# Patient Record
Sex: MALE | Race: WHITE | Employment: FULL TIME | URBAN - METROPOLITAN AREA
[De-identification: names, ages, dates, MRNs, and addresses within clinical notes are randomized per-mention and may not be internally consistent; named-entity substitution may affect disease eponyms.]

---

## 2017-01-21 ENCOUNTER — ED HISTORICAL/CONVERTED ENCOUNTER (OUTPATIENT)
Dept: OTHER | Age: 22
End: 2017-01-21

## 2022-01-03 ENCOUNTER — HOSPITAL ENCOUNTER (EMERGENCY)
Age: 27
Discharge: LEFT BEFORE DISCHARGE | End: 2022-01-03
Payer: COMMERCIAL

## 2022-01-03 VITALS
SYSTOLIC BLOOD PRESSURE: 124 MMHG | TEMPERATURE: 98.4 F | RESPIRATION RATE: 16 BRPM | OXYGEN SATURATION: 96 % | HEIGHT: 72 IN | HEART RATE: 90 BPM | DIASTOLIC BLOOD PRESSURE: 82 MMHG | WEIGHT: 235 LBS | BODY MASS INDEX: 31.83 KG/M2

## 2022-01-03 PROCEDURE — 75810000275 HC EMERGENCY DEPT VISIT NO LEVEL OF CARE

## 2022-01-04 ENCOUNTER — HOSPITAL ENCOUNTER (EMERGENCY)
Age: 27
Discharge: HOME | End: 2022-01-04
Attending: EMERGENCY MEDICINE
Payer: COMMERCIAL

## 2022-01-04 VITALS
OXYGEN SATURATION: 98 % | RESPIRATION RATE: 18 BRPM | SYSTOLIC BLOOD PRESSURE: 126 MMHG | WEIGHT: 235 LBS | HEART RATE: 77 BPM | HEIGHT: 72 IN | DIASTOLIC BLOOD PRESSURE: 89 MMHG | TEMPERATURE: 98.6 F | BODY MASS INDEX: 31.83 KG/M2

## 2022-01-04 DIAGNOSIS — B34.9 VIRAL SYNDROME: Primary | ICD-10-CM

## 2022-01-04 PROCEDURE — 99282 EMERGENCY DEPT VISIT SF MDM: CPT

## 2022-01-04 RX ORDER — NAPROXEN 500 MG/1
500 TABLET ORAL
Qty: 20 TABLET | Refills: 0 | Status: SHIPPED | OUTPATIENT
Start: 2022-01-04

## 2022-01-04 NOTE — LETTER
[facility] EMERGENCY DEPARTMENT  [address] [id number] [phone]    Work/School Note    Date: 1/4/2022     To Whom It May concern:    Loan Centeno was evaluated by the following provider(s):  Attending Provider: Priscila Umanzor [facility] virus is suspected. Per the CDC guidelines we recommend home isolation until the following conditions are all met:    1. At least five days have passed since symptoms first appeared and/or had a close exposure,   2. After home isolation for five days, wearing a mask around others for the next five days,  3. At least 24 have passed since last fever without the use of fever-reducing medications and  4.  Symptoms (eg cough, shortness of breath) have improved      Sincerely,    Dr. Kalina Iraheta

## 2022-01-04 NOTE — ED PROVIDER NOTES
EMERGENCY DEPARTMENT HISTORY AND PHYSICAL EXAM      Date: 1/4/2022  Patient Name: Gudelia Nicole    History of Presenting Illness     Chief Complaint   Patient presents with    Fever    Generalized Body Aches    Nasal Congestion       History Provided By: Patient    HPI: Gudelia Nicole, 27 y.o. male presents to the ED with CC of fever generalized body aches nasal congestion with some mild headache going on for the past couple of days. Patient conveys that is been exposed to people with Covid. Treating with over-the-counter remedies without complete relief of his symptoms. Symptoms started on Monday of this week. Symptoms are mild and progressive. Patient denies SOB, chest pain, or any neurological symptoms. There are no other complaints, changes, or physical findings at this time. Past History     Past Medical History:  No past medical history on file. Allergies: Allergies   Allergen Reactions    Benadryl [Diphenhydramine Hcl] Itching       Review of Systems   Vital signs and nursing notes reviewed  Review of Systems   Constitutional: Positive for fever. Negative for appetite change, chills and fatigue. HENT: Positive for congestion. Negative for sinus pain. Eyes: Negative. Negative for pain and visual disturbance. Respiratory: Negative. Negative for chest tightness and shortness of breath. Cardiovascular: Negative. Negative for chest pain. Gastrointestinal: Negative. Negative for abdominal pain, diarrhea, nausea and vomiting. Genitourinary: Negative. Negative for difficulty urinating. No discharge   Musculoskeletal: Positive for myalgias. Negative for arthralgias. Skin: Negative. Negative for rash. Neurological: Positive for headaches. Negative for weakness. Hematological: Negative. Psychiatric/Behavioral: Negative. Negative for agitation. The patient is not nervous/anxious. All other systems reviewed and are negative.         Physical Exam     Visit Vitals  /89 (BP 1 Location: Right upper arm, BP Patient Position: At rest)   Pulse 77   Temp 98.6 °F (37 °C)   Resp 18   Ht 6' (1.829 m)   Wt 106.6 kg (235 lb)   SpO2 98%   BMI 31.87 kg/m²     CONSTITUTIONAL: Alert, in no distress. Appears stated age. HEAD:  Normocephalic, atraumatic  EYES: EOM intact. No conjunctival injection or scleral icterus  Neck:  Supple. No meningismus  RESP: Normal with no work of breathing, speaking in full sentences. CV: Well perfused. NEURO: Alert with normal mentation, moving extremities spontaneously  PSYCH: Normal mood, normal affect  *    Medical Decision Making   Patient presents for COVID 19 testing with normal oxygen saturation and mild URI symptoms or COVID 19 exposure. COVID 19 testing was not conducted. The patient was given quarantine/isolation recommendations and agrees with the plan to be discharged home. They were provided instructions to return for difficulty breathing, chest pain, altered mentation, or any other new or worsening symptoms. ED Course:   Initial assessment performed. The patients presenting problems have been discussed, and they are in agreement with the care plan formulated and outlined with them. I have encouraged them to ask questions as they arise throughout their visit. Critical Care Time: None    Disposition:  DISCHARGE NOTE:  The pt is ready for discharge. The pt's signs, symptoms, diagnosis, and discharge instructions have been discussed and pt has conveyed their understanding. The pt is to follow up as recommended or return to ER should their symptoms worsen. Plan has been discussed and pt is in agreement. PLAN:  1. Current Discharge Medication List      START taking these medications    Details   naproxen (NAPROSYN) 500 mg tablet Take 1 Tablet by mouth every twelve (12) hours as needed for Pain. Qty: 20 Tablet, Refills: 0  Start date: 1/4/2022           2.    Follow-up Information     Follow up With Specialties Details Why Contact Info    [facility] Emergency Medicine Call in 1 week As needed, If symptoms worsen [address] [place] [id number] [phone]        3. COVID Testing results will be called once available if positive. Patient should utilize [facility] to access results. 4. Take Tylenol or Ibuprofen as needed  5. Drink plenty of fluids  6. Return to ED if worse especially if any shortness of breath, chest pain or altered mentation. Diagnosis     Clinical Impression:   1. Viral syndrome            Please note that this dictation was completed with [employer], the computer voice recognition software. Quite often unanticipated grammatical, syntax, homophones, and other interpretive errors are inadvertently transcribed by the computer software. Please disregards these errors. Please excuse any errors that have escaped final proofreading.

## 2022-01-04 NOTE — Clinical Note
[address] [facility] EMERGENCY DEPARTMENT  [address] [place] [address]  [phone]    Work/School Note    Date: 1/4/2022     To Whom It May concern:    Alfreda Morejon was evaluated by the following provider(s):  Attending Provider: Joe Estrada [address] [place] [address] virus is suspected. Per the CDC guidelines we recommend home isolation until the following conditions are all met:    1. At least five days have passed since symptoms first appeared and/or had a close exposure,   2. After home isolation for five days, wearing a mask around others for the next five days,  3. At least 24 have passed since last fever without the use of fever-reducing medications and  4.  Symptoms (eg cough, shortness of breath) have improved      Sincerely,          Jenny Carreon MD

## 2022-01-04 NOTE — Clinical Note
Eliazar Restrepo was seen and treated in our emergency department on 1/4/2022.     Patient should be excused from work from January 3, 2022 to January 8, 2022    Kev Du MD

## 2022-04-08 ENCOUNTER — HOSPITAL ENCOUNTER (EMERGENCY)
Age: 27
Discharge: HOME | End: 2022-04-08
Attending: EMERGENCY MEDICINE
Payer: COMMERCIAL

## 2022-04-08 VITALS
HEART RATE: 96 BPM | OXYGEN SATURATION: 97 % | SYSTOLIC BLOOD PRESSURE: 140 MMHG | BODY MASS INDEX: 30.48 KG/M2 | RESPIRATION RATE: 18 BRPM | WEIGHT: 225 LBS | HEIGHT: 72 IN | TEMPERATURE: 98 F | DIASTOLIC BLOOD PRESSURE: 86 MMHG

## 2022-04-08 DIAGNOSIS — L60.0 INGROWN RIGHT GREATER TOENAIL: Primary | ICD-10-CM

## 2022-04-08 DIAGNOSIS — L65.9 HAIR LOSS: ICD-10-CM

## 2022-04-08 PROCEDURE — 74011250637 HC RX REV CODE- 250/637: Performed by: EMERGENCY MEDICINE

## 2022-04-08 PROCEDURE — 99283 EMERGENCY DEPT VISIT LOW MDM: CPT

## 2022-04-08 RX ORDER — CEPHALEXIN 500 MG/1
500 CAPSULE ORAL ONCE
Status: COMPLETED | OUTPATIENT
Start: 2022-04-08 | End: 2022-04-08

## 2022-04-08 RX ORDER — CEPHALEXIN 500 MG/1
500 CAPSULE ORAL 4 TIMES DAILY
Qty: 28 CAPSULE | Refills: 0 | Status: SHIPPED | OUTPATIENT
Start: 2022-04-08 | End: 2022-04-15

## 2022-04-08 RX ORDER — IBUPROFEN 800 MG/1
800 TABLET ORAL
Qty: 20 TABLET | Refills: 0 | Status: SHIPPED | OUTPATIENT
Start: 2022-04-08 | End: 2022-04-15

## 2022-04-08 RX ORDER — ACETAMINOPHEN 500 MG
1000 TABLET ORAL ONCE
Status: COMPLETED | OUTPATIENT
Start: 2022-04-08 | End: 2022-04-08

## 2022-04-08 RX ORDER — IBUPROFEN 800 MG/1
800 TABLET ORAL ONCE
Status: COMPLETED | OUTPATIENT
Start: 2022-04-08 | End: 2022-04-08

## 2022-04-08 RX ADMIN — IBUPROFEN 800 MG: 800 TABLET, FILM COATED ORAL at 21:12

## 2022-04-08 RX ADMIN — CEPHALEXIN 500 MG: 500 CAPSULE ORAL at 21:13

## 2022-04-08 RX ADMIN — ACETAMINOPHEN 1000 MG: 500 TABLET ORAL at 21:12

## 2022-04-08 NOTE — Clinical Note
[address] [facility] EMERGENCY DEPARTMENT  Katharine Ferreira [place] [id number]  [phone]    Work/School Note    Date: 4/8/2022    To Whom It May concern:    Edwina Arenas was seen and treated today in the emergency room by the following provider(s):  Attending Provider: Linus Lancaster MD.      Edwina Arenas is excused from work/school on 4/8/2022 through 4/10/2022. He is medically clear to return to work/school on 4/11/2022.          Sincerely,          Jaki Hui MD

## 2022-04-09 NOTE — ED TRIAGE NOTES
Pt states toe infection, right great toe, x 2 D, painful, also states \"abnormal amount\" of hair loss, denies scalp itching or pain.

## 2022-04-09 NOTE — ED PROVIDER NOTES
EMERGENCY DEPARTMENT HISTORY AND PHYSICAL EXAM      Date: 4/8/2022  Patient Name: Jose F Winter    History of Presenting Illness     Chief Complaint   Patient presents with    Toe Pain    Alopecia       History Provided By: Patient    HPI: Jose F Winter, 24 y.o. male   presents to the ED with cc of toe pain. Patient complains of right first toe discomfort since yesterday. No injury. No fever chills. Patient also complains of generalized hair loss for last several weeks. Positive family history of baldness. PCP: None    No current facility-administered medications on file prior to encounter. Current Outpatient Medications on File Prior to Encounter   Medication Sig Dispense Refill    naproxen (NAPROSYN) 500 mg tablet Take 1 Tablet by mouth every twelve (12) hours as needed for Pain. 20 Tablet 0       Past History     Past Medical History:  No past medical history on file. Past Surgical History:  No past surgical history on file. Family History:  No family history on file. Social History:  Social History     Tobacco Use    Smoking status: Never Smoker    Smokeless tobacco: Never Used   Substance Use Topics    Alcohol use: Yes     Comment: infrenquent    Drug use: Never       Allergies: Allergies   Allergen Reactions    Benadryl [Diphenhydramine Hcl] Itching         Review of Systems   Review of Systems   Constitutional: Negative for fever. Musculoskeletal: Positive for arthralgias. Skin: Positive for wound. Neurological: Negative for headaches. Physical Exam   Physical Exam  Vitals and nursing note reviewed. Constitutional:       General: He is not in acute distress. Appearance: Normal appearance. He is not ill-appearing, toxic-appearing or diaphoretic. HENT:      Head: Normocephalic and atraumatic.       Comments: No appreciable bald spot     Mouth/Throat:      Mouth: Mucous membranes are moist.   Eyes:      Conjunctiva/sclera: Conjunctivae normal. Pulmonary:      Effort: Pulmonary effort is normal.   Musculoskeletal:      Cervical back: Neck supple. Comments: Right first toe with mild swelling on the lateral side without drainage consistent with a mild ingrown toenail   Skin:     General: Skin is warm and dry. Neurological:      General: No focal deficit present. Mental Status: He is alert. Psychiatric:         Behavior: Behavior normal.         Diagnostic Study Results     Labs -   No results found for this or any previous visit (from the past 12 hour(s)). Radiologic Studies -   No orders to display     CT Results  (Last 48 hours)    None        CXR Results  (Last 48 hours)    None            Medical Decision Making   I am the first provider for this patient. I reviewed the vital signs, available nursing notes, past medical history, past surgical history, family history and social history. Vital Signs-Reviewed the patient's vital signs. Patient Vitals for the past 12 hrs:   Temp Pulse Resp BP SpO2   04/08/22 2035 98 °F (36.7 °C) 96 18 (!) 140/86 97 %       Records Reviewed:     Provider Notes (Medical Decision Making):       ED Course:   Initial assessment performed. The patients presenting problems have been discussed, and they are in agreement with the care plan formulated and outlined with them. I have encouraged them to ask questions as they arise throughout their visit. PROCEDURES      Disposition: Condition stable   DC- Adult Discharges: All of the diagnostic tests were reviewed and questions answered. Diagnosis, care plan and treatment options were discussed. understand instructions and will follow up as directed. The patients results have been reviewed with them. They have been counseled regarding their diagnosis. The patient verbally convey understanding and agreement of the signs, symptoms, diagnosis, treatment and prognosis and additionally agrees to follow up as recommended.   They also agree with the care-plan and convey that all of their questions have been answered. I have also put together some discharge instructions for them that include: 1) educational information regarding their diagnosis, 2) how to care for their diagnosis at home, as well a 3) list of reasons why they would want to return to the ED prior to their follow-up appointment, should their condition change. PLAN:  1. Current Discharge Medication List      START taking these medications    Details   ibuprofen (MOTRIN) 800 mg tablet Take 1 Tablet by mouth every eight (8) hours as needed for Pain for up to 7 days. Qty: 20 Tablet, Refills: 0  Start date: 4/8/2022, End date: 4/15/2022      cephALEXin (Keflex) 500 mg capsule Take 1 Capsule by mouth four (4) times daily for 7 days. Qty: 28 Capsule, Refills: 0  Start date: 4/8/2022, End date: 4/15/2022           2. Follow-up Information     Follow up With Specialties Details Why Contact Info    [facility]   [address] [address]  [phone]      [facility]  Schedule an appointment as soon as possible for a visit   Patricia [address]  [phone]        Return to ED if worse     Diagnosis     Clinical Impression:   1. Ingrown right greater toenail    2. Hair loss        Please note that this dictation was completed with [employer], the computer voice recognition software. Quite often unanticipated grammatical, syntax, homophones, and other interpretive errors are inadvertently transcribed by the computer software. Please disregard these errors. Please excuse any errors that have escaped final proofreading. Thank you.

## 2022-07-08 ENCOUNTER — HOSPITAL ENCOUNTER (EMERGENCY)
Age: 27
Discharge: HOME | End: 2022-07-08
Attending: STUDENT IN AN ORGANIZED HEALTH CARE EDUCATION/TRAINING PROGRAM
Payer: COMMERCIAL

## 2022-07-08 VITALS
TEMPERATURE: 98 F | DIASTOLIC BLOOD PRESSURE: 88 MMHG | RESPIRATION RATE: 18 BRPM | OXYGEN SATURATION: 96 % | BODY MASS INDEX: 29.8 KG/M2 | SYSTOLIC BLOOD PRESSURE: 129 MMHG | HEIGHT: 72 IN | WEIGHT: 220 LBS | HEART RATE: 93 BPM

## 2022-07-08 DIAGNOSIS — J06.9 VIRAL URI: Primary | ICD-10-CM

## 2022-07-08 LAB — DEPRECATED S PYO AG THROAT QL EIA: NEGATIVE

## 2022-07-08 PROCEDURE — 99283 EMERGENCY DEPT VISIT LOW MDM: CPT

## 2022-07-08 PROCEDURE — 74011250637 HC RX REV CODE- 250/637: Performed by: STUDENT IN AN ORGANIZED HEALTH CARE EDUCATION/TRAINING PROGRAM

## 2022-07-08 PROCEDURE — 87880 STREP A ASSAY W/OPTIC: CPT

## 2022-07-08 RX ORDER — GUAIFENESIN 100 MG/5ML
200 SOLUTION ORAL
Qty: 200 ML | Refills: 0 | Status: SHIPPED | OUTPATIENT
Start: 2022-07-08

## 2022-07-08 RX ORDER — ACETAMINOPHEN 325 MG/1
650 TABLET ORAL
Qty: 20 TABLET | Refills: 0 | Status: SHIPPED | OUTPATIENT
Start: 2022-07-08

## 2022-07-08 RX ORDER — METHOCARBAMOL 750 MG/1
750 TABLET, FILM COATED ORAL
Qty: 20 TABLET | Refills: 0 | Status: SHIPPED | OUTPATIENT
Start: 2022-07-08

## 2022-07-08 RX ORDER — ONDANSETRON 4 MG/1
4 TABLET, FILM COATED ORAL
Qty: 20 TABLET | Refills: 0 | Status: SHIPPED | OUTPATIENT
Start: 2022-07-08

## 2022-07-08 RX ORDER — IBUPROFEN 600 MG/1
600 TABLET ORAL
Qty: 20 TABLET | Refills: 0 | Status: SHIPPED | OUTPATIENT
Start: 2022-07-08

## 2022-07-08 RX ORDER — ONDANSETRON 4 MG/1
4 TABLET, ORALLY DISINTEGRATING ORAL
Status: COMPLETED | OUTPATIENT
Start: 2022-07-08 | End: 2022-07-08

## 2022-07-08 RX ORDER — IBUPROFEN 600 MG/1
600 TABLET ORAL
Status: COMPLETED | OUTPATIENT
Start: 2022-07-08 | End: 2022-07-08

## 2022-07-08 RX ADMIN — ONDANSETRON 4 MG: 4 TABLET, ORALLY DISINTEGRATING ORAL at 17:00

## 2022-07-08 RX ADMIN — IBUPROFEN 600 MG: 600 TABLET, FILM COATED ORAL at 17:00

## 2022-07-08 NOTE — ED PROVIDER NOTES
[facility]  EMERGENCY DEPARTMENT ENCOUNTER NOTE    Date: 7/8/2022  Patient Name: Clarice Ha    History of Presenting Illness     Chief Complaint   Patient presents with    Fever    Headache    Sore Throat     HPI: Clarice Ha, 24 y.o. male with a past medical history and home medications as listed below presents for URI symptoms. The patient reports a 3 day history of the following symptoms: Fatigue, Congestion, Cough, Sore Throat, Nausea and Vomiting. The patient does not report any Abdominal Pain and Chest Pain. Airway obstructive such as stridor, difficulty in swallowing secretions, thickened voice change significantly muffled voice, and significant neck swelling is not present    Patient had exposure to mother with similar symptoms    Medical History   I reviewed the medical, surgical, family, and social history, as well as allergies:    PCP: None    Past Medical History:  History reviewed. No pertinent past medical history. Past Surgical History:  Past Surgical History:   Procedure Laterality Date    HX APPENDECTOMY       Current Outpatient Medications:  Current Outpatient Medications   Medication Instructions    acetaminophen (TYLENOL) 650 mg, Oral, EVERY 6 HOURS AS NEEDED    guaiFENesin (ROBITUSSIN) 200 mg, Oral, 3 TIMES DAILY AS NEEDED    ibuprofen (MOTRIN) 600 mg, Oral, 3 TIMES DAILY AS NEEDED    methocarbamoL (ROBAXIN-750) 750 mg, Oral, 3 TIMES DAILY AS NEEDED    naproxen (NAPROSYN) 500 mg, Oral, EVERY 12 HOURS AS NEEDED    ondansetron hcl (ZOFRAN) 4 mg, Oral, EVERY 8 HOURS AS NEEDED      Family History:  History reviewed. No pertinent family history. Social History:  Social History     Tobacco Use    Smoking status: Never Smoker    Smokeless tobacco: Never Used   Substance Use Topics    Alcohol use: Yes     Comment: infrenquent    Drug use: Never     Allergies:   Allergies   Allergen Reactions    Benadryl [Diphenhydramine Hcl] Itching       Review of Systems     All other systems negative. Physical Exam and Vital Signs   Vital Signs - Reviewed the patient's vital signs. Patient Vitals for the past 12 hrs:   Temp Pulse Resp BP SpO2   07/08/22 1619 98 °F (36.7 °C) 93 18 129/88 96 %     Physical Exam:    GENERAL: awake, alert, cooperative, not in distress  HEENT  * Pupils equal, head atraumatic  * Normal voice, no drooling, no stridor  * No facial swelling, erythema, or cellulitis  * No sublingual fullness  * Normal uvula without deviation  * No tonsillitis or evidence of abscess  * Noted pharyngeal erythema w/o exudates  CV:  * warm extremities  * no cyanosis  PULMONARY: no respiratory distress, non labored breathing, no audible wheezing or stridor, no accessory muscle use  ABDOMEN: soft, moving in bed and pulls to seated position without grimace or pain  EXTREMITIES/BACK: moving four extremities without limitation  SKIN: no rashes or signs of trauma  NEURO:  * Speech clear  * GCS 15      Medical Decision Making   - I am the first and primary provider for this patient and am the primary provider of record. - I reviewed the vital signs, available nursing notes, past medical history, past surgical history, family history and social history. - Initial assessment performed. The patients presenting problems have been discussed, and the staff are in agreement with the care plan formulated and outlined with them. I have encouraged them to ask questions as they arise throughout their visit. - Available medical records, nursing notes, old EKGs, and EMS run sheets (if patient was EMS transported) were reviewed    MDM:   Patient is a 24 y.o. male presenting for URI symptoms. Vitals reveal no hypoxia, hypotension or tachypnea and physical exam reveals no significant abnormalities. Based on the history, physical exam, risk factors, and vital signs, differential includes: URI, COVID19, Flu, postnasal drip, common cold.      This well-appearing patient presents with URI symptoms with low suspicion for serious bacterial infection given nontoxic appearance. Patient has good PO intake, no lethargy, and no physical exam or vital sign findings to suggest clinically significant dehydration or malignant process. There is low suspicion for pneumonia as the patient has no abnormal lung sounds on exam, appears nontoxic, and has a reassuring clinical picture. The presentation is consistent with an isolated viral upper respiratory tract infection. Results     Labs:  Recent Results (from the past 12 hour(s))   STREP AG SCREEN, GROUP A    Collection Time: 07/08/22  4:45 PM    Specimen: Throat   Result Value Ref Range    Group A Strep Ag ID Negative       Radiologic Studies:  CT Results  (Last 48 hours)    None        CXR Results  (Last 48 hours)    None        Medications ordered:  Medications   ondansetron (ZOFRAN ODT) tablet 4 mg (4 mg Oral Given 7/8/22 1700)   ibuprofen (MOTRIN) tablet 600 mg (600 mg Oral Given 7/8/22 1700)     ED Course and Reassessment     ED Course:     ED Course as of 07/08/22 1702 Fri Jul 08, 2022 1702 Strep -ve. [SS]      ED Course User Index  [SS] Tammy Mckeon MD       Reassessment:    Patient is well appearing, not hypoxic, and is not in respiratory distress. Due to normal exam and the absence of hypoxia, there is no concern for significant pneumonia. Patient does not meet criteria for admission or inpatient treatment. As the patient appears generally well, non-toxic with a completely reassuring clinical picture and exam, and is able to tolerate PO intake, I feel the patient is a good candidate for outpatient treatment with return precautions. Understanding was insured that at this time there is no evidence for a more malignant underlying process, but that early in the process of an illness, an emergency department workup can be falsely reassuring.      Routine discharge counseling was given including the fact that any worsening, changing or persistent symptoms should prompt an immediate call or follow up with their primary physician or the emergency department. The importance of appropriate follow up was also discussed. More extensive discharge instructions were given in the patient's discharge paperwork. After completion of evaluation and discussion of results and diagnoses, all the questions were answered. If required, all follow up appointments and treatments were discussed and explained. Understanding was insured prior to discharge. Final Disposition     DISPOSITION: DISCHARGED    Patient will be discharged from the Emergency Department in stable condition. All of the diagnostic tests were reviewed and any questions were answered. Diagnosis, results, follow up if applicable, and return precautions were discussed. I have also put together printed discharge instructions for them that include: 1) educational information regarding their diagnosis, 2) how to care for their diagnosis at home, as well a 3) list of reasons why they would want to return to the ED prior to their follow-up appointment, should their condition change. Any labs or imaging done in the ED will be either printed with the discharge paperwork or available through [address] DISCHARGE PLAN:  1. Current Discharge Medication List      CONTINUE these medications which have NOT CHANGED    Details   naproxen (NAPROSYN) 500 mg tablet Take 1 Tablet by mouth every twelve (12) hours as needed for Pain. Qty: 20 Tablet, Refills: 0            2.   Follow-up Information     Follow up With Specialties Details Why Contact Info    [address] [place] [address] Emergency Medicine Go to  If symptoms worsen [address]  [phone]        3. Return to ED if worse    4.    Current Discharge Medication List      START taking these medications    Details   acetaminophen (TYLENOL) 325 mg tablet Take 2 Tablets by mouth every six (6) hours as needed for Pain. Qty: 20 Tablet, Refills: 0  Start date: 7/8/2022      ondansetron hcl (Zofran) 4 mg tablet Take 1 Tablet by mouth every eight (8) hours as needed for Nausea or Vomiting. Qty: 20 Tablet, Refills: 0  Start date: 7/8/2022      methocarbamoL (Robaxin-750) 750 mg tablet Take 1 Tablet by mouth three (3) times daily as needed for Muscle Spasm(s). Qty: 20 Tablet, Refills: 0  Start date: 7/8/2022      ibuprofen (MOTRIN) 600 mg tablet Take 1 Tablet by mouth three (3) times daily as needed for Pain. Qty: 20 Tablet, Refills: 0  Start date: 7/8/2022      guaiFENesin (ROBITUSSIN) 100 mg/5 mL liquid Take 10 mL by mouth three (3) times daily as needed for Cough. Qty: 200 mL, Refills: 0  Start date: 7/8/2022             Diagnosis     Clinical Impression:   1. Viral URI      Attestations:    Komal Espinoza MD    Please note that this dictation was completed with [employer] the [employer] voice recognition software. Quite often unanticipated grammatical, syntax, homophones, and other interpretive errors are inadvertently transcribed by the computer software. Please disregard these errors. Please excuse any errors that have escaped final proofreading. Thank you.

## 2022-07-08 NOTE — DISCHARGE INSTRUCTIONS
Thank you! Thank you for allowing me to care for you in the emergency department. I sincerely hope that you are satisfied with your visit today. It is my goal to provide you with excellent care. Below you will find a list of your labs and imaging from your visit today if applicable. Should you have any questions regarding these results please do not hesitate to call the emergency department. Please review [facility] for a more detailed result list since the below list may not be comprehensive. Instructions on how to sign up to [facility] should be provided in this packet. Labs -     Recent Results (from the past 12 hour(s))   STREP AG SCREEN, GROUP A    Collection Time: 07/08/22  4:45 PM    Specimen: Throat   Result Value Ref Range    Group A Strep Ag ID Negative         Radiologic Studies -   No orders to display     CT Results  (Last 48 hours)      None          CXR Results  (Last 48 hours)      None               If you feel that you have not received excellent quality care or timely care, please ask to speak to the nurse manager. Please choose us in the future for your continued health care needs. ------------------------------------------------------------------------------------------------------------  The exam and treatment you received in the Emergency Department were for an urgent problem and are not intended as complete care. It is important that you follow-up with a doctor, nurse practitioner, or physician assistant to:  (1) confirm your diagnosis,  (2) re-evaluation of changes in your illness and treatment, and  (3) for ongoing care. If your symptoms become worse or you do not improve as expected and you are unable to reach your usual health care provider, you should return to the Emergency Department. We are available 24 hours a day. Please take your discharge instructions with you when you go to your follow-up appointment.      If a prescription has been provided, please have it filled as soon as possible to prevent a delay in treatment. Read the entire medication instruction sheet provided to you by the pharmacy. If you have any questions or reservations about taking the medication due to side effects or interactions with other medications, please call your primary care physician or contact the ER to speak with the charge nurse. Make an appointment with your family doctor or the physician you were referred to for follow-up of this visit as instructed on your discharge paperwork, as this is a mandatory follow-up. Return to the ER if you are unable to be seen or if you are unable to be seen in a timely manner. If you have any problem arranging the follow-up visit, contact the Emergency Department immediately.

## 2022-07-08 NOTE — LETTER
Evelyn Hernandes was seen and treated in our emergency department on 7/8/2022. He may return to work on 7/11/2022. If you have any questions or concerns, please don't hesitate to call.       Dr. Caden Metcalf

## 2022-07-08 NOTE — ED TRIAGE NOTES
Pt c/o not feeling well two days ago. C/o nausea, vomiting, fever, headache, sore throat. Mother had similar sx.

## 2022-11-10 ENCOUNTER — HOSPITAL ENCOUNTER (EMERGENCY)
Age: 27
Discharge: HOME | End: 2022-11-10
Payer: COMMERCIAL

## 2022-11-10 VITALS
HEIGHT: 72 IN | TEMPERATURE: 98.3 F | RESPIRATION RATE: 19 BRPM | DIASTOLIC BLOOD PRESSURE: 83 MMHG | OXYGEN SATURATION: 98 % | WEIGHT: 230 LBS | BODY MASS INDEX: 31.15 KG/M2 | SYSTOLIC BLOOD PRESSURE: 127 MMHG | HEART RATE: 92 BPM

## 2022-11-10 DIAGNOSIS — J06.9 VIRAL URI WITH COUGH: Primary | ICD-10-CM

## 2022-11-10 LAB
FLUAV AG NPH QL IA: NEGATIVE
FLUBV AG NOSE QL IA: NEGATIVE

## 2022-11-10 PROCEDURE — 87804 INFLUENZA ASSAY W/OPTIC: CPT

## 2022-11-10 PROCEDURE — 99283 EMERGENCY DEPT VISIT LOW MDM: CPT

## 2022-11-10 RX ORDER — ACETAMINOPHEN 325 MG/1
650 TABLET ORAL
Qty: 20 TABLET | Refills: 0 | Status: SHIPPED | OUTPATIENT
Start: 2022-11-10

## 2022-11-10 RX ORDER — ALBUTEROL SULFATE 90 UG/1
2 AEROSOL, METERED RESPIRATORY (INHALATION) AS NEEDED
Qty: 18 G | Refills: 0 | Status: SHIPPED | OUTPATIENT
Start: 2022-11-10

## 2022-11-10 RX ORDER — LORATADINE AND PSEUDOEPHEDRINE SULFATE 5; 120 MG/1; MG/1
1 TABLET, EXTENDED RELEASE ORAL 2 TIMES DAILY
Qty: 30 TABLET | Refills: 0 | Status: SHIPPED | OUTPATIENT
Start: 2022-11-10

## 2022-11-10 RX ORDER — PROMETHAZINE HYDROCHLORIDE AND DEXTROMETHORPHAN HYDROBROMIDE 6.25; 15 MG/5ML; MG/5ML
5 SYRUP ORAL
Qty: 118 ML | Refills: 0 | Status: SHIPPED | OUTPATIENT
Start: 2022-11-10 | End: 2022-11-17

## 2022-11-10 NOTE — ED PROVIDER NOTES
EMERGENCY DEPARTMENT HISTORY AND PHYSICAL EXAM      Date: 11/10/2022  Patient Name: Jennifer Ambrosio    History of Presenting Illness     Chief Complaint   Patient presents with    Flu Like Symptoms       History Provided By: Patient    HPI: Jennifer Ambrosio, 24 y.o. male with no history presents for three days of cough, congestion, and fevers. He states this weekend he was around some people that ended up getting sick on Sunday and he started feeling bad shortly after that. He has tried some over the counter medication with no relief of symptoms. Denies any abdominal pain, difficulty breathing, shortness of breath, or chest pain. There are no other complaints, changes, or physical findings at this time. PCP: None    No current facility-administered medications on file prior to encounter. Current Outpatient Medications on File Prior to Encounter   Medication Sig Dispense Refill    acetaminophen (TYLENOL) 325 mg tablet Take 2 Tablets by mouth every six (6) hours as needed for Pain. 20 Tablet 0    ondansetron hcl (Zofran) 4 mg tablet Take 1 Tablet by mouth every eight (8) hours as needed for Nausea or Vomiting. 20 Tablet 0    methocarbamoL (Robaxin-750) 750 mg tablet Take 1 Tablet by mouth three (3) times daily as needed for Muscle Spasm(s). 20 Tablet 0    ibuprofen (MOTRIN) 600 mg tablet Take 1 Tablet by mouth three (3) times daily as needed for Pain. 20 Tablet 0    guaiFENesin (ROBITUSSIN) 100 mg/5 mL liquid Take 10 mL by mouth three (3) times daily as needed for Cough. 200 mL 0    naproxen (NAPROSYN) 500 mg tablet Take 1 Tablet by mouth every twelve (12) hours as needed for Pain. 20 Tablet 0       Past History     Past Medical History:  No past medical history on file. Past Surgical History:  Past Surgical History:   Procedure Laterality Date    HX APPENDECTOMY         Family History:  No family history on file.     Social History:  Social History     Tobacco Use    Smoking status: Never Smokeless tobacco: Never   Substance Use Topics    Alcohol use: Yes     Comment: infrenquent    Drug use: Never       Allergies: Allergies   Allergen Reactions    Benadryl [Diphenhydramine Hcl] Itching       Review of Systems   Review of Systems   Constitutional:  Positive for chills, fatigue and fever. Negative for activity change. HENT:  Positive for congestion, postnasal drip, rhinorrhea and sore throat. Negative for ear discharge, ear pain, sinus pressure and sinus pain. Eyes:  Negative for photophobia and visual disturbance. Respiratory:  Positive for cough. Negative for apnea, choking, chest tightness and shortness of breath. Cardiovascular:  Negative for chest pain and palpitations. Gastrointestinal:  Negative for abdominal pain, diarrhea, nausea and vomiting. Endocrine: Negative. Genitourinary:  Negative for dysuria and flank pain. Musculoskeletal:  Negative for arthralgias, back pain, myalgias and neck pain. Skin:  Negative for color change and pallor. Neurological:  Negative for dizziness, syncope, light-headedness and headaches. Psychiatric/Behavioral:  Negative for agitation, behavioral problems and confusion. All other systems reviewed and are negative. Physical Exam   Physical Exam  Vitals and nursing note reviewed. Constitutional:       Appearance: Normal appearance. HENT:      Head: Normocephalic and atraumatic. Right Ear: Tympanic membrane, ear canal and external ear normal.      Left Ear: Tympanic membrane, ear canal and external ear normal.      Nose: Congestion and rhinorrhea present. Mouth/Throat:      Mouth: Mucous membranes are moist.      Pharynx: Posterior oropharyngeal erythema present. No oropharyngeal exudate. Eyes:      Extraocular Movements: Extraocular movements intact. Pupils: Pupils are equal, round, and reactive to light. Cardiovascular:      Rate and Rhythm: Normal rate and regular rhythm.    Pulmonary:      Effort: Pulmonary effort is normal.      Breath sounds: Normal breath sounds. Abdominal:      General: Abdomen is flat. Palpations: Abdomen is soft. Musculoskeletal:      Cervical back: Normal range of motion. Skin:     General: Skin is warm and dry. Neurological:      General: No focal deficit present. Mental Status: He is alert and oriented to person, place, and time. Mental status is at baseline. Psychiatric:         Behavior: Behavior normal.       Lab and Diagnostic Study Results   Labs -     Recent Results (from the past 12 hour(s))   INFLUENZA A & B AG (RAPID TEST)    Collection Time: 11/10/22  2:00 PM   Result Value Ref Range    Influenza A Antigen Negative Negative      Influenza B Antigen Negative Negative         Radiologic Studies -   @lastxrresult@  CT Results  (Last 48 hours)      None          CXR Results  (Last 48 hours)      None            Medical Decision Making and ED Course   Differential Diagnosis & Medical Decision Making Provider Note:   The patient presents with nasal congestion, rhinorrhea, and cough/sore throat. Symptoms are consistent with an uncomplicated viral URI. DDx: pharyngitis, flu. Physical exam points away from PNA. Flu A/B swab negative- explained symptom management used in viral URI. Symptomatic therapy suggested: acetaminophen or ibuprofen for pain or fevers, Zarbees for cough and congestion. Increase fluids, use cool mist humidifier. Apply facial warm packs for sinus pain or use nasal saline sprays. Above age 9, for sore throat can try chloroseptic spray, gargle with salt water. - I am the first provider for this patient. I reviewed the vital signs, available nursing notes, past medical history, past surgical history, family history and social history. The patients presenting problems have been discussed, and they are in agreement with the care plan formulated and outlined with them.   I have encouraged them to ask questions as they arise throughout their visit.    Vital Signs-Reviewed the patient's vital signs. Patient Vitals for the past 12 hrs:   Temp Pulse Resp BP SpO2   11/10/22 1354 98.3 °F (36.8 °C) 92 19 127/83 98 %       ED Course:        Procedures   Performed by: Thomas Tyler PA-C  Procedures      Disposition   Disposition: DC- Adult Discharges: All of the diagnostic tests were reviewed and questions answered. Diagnosis, care plan and treatment options were discussed. The patient understands the instructions and will follow up as directed. The patients results have been reviewed with them. They have been counseled regarding their diagnosis. The patient verbally convey understanding and agreement of the signs, symptoms, diagnosis, treatment and prognosis and additionally agrees to follow up as recommended with their PCP in 24 - 48 hours. They also agree with the care-plan and convey that all of their questions have been answered. I have also put together some discharge instructions for them that include: 1) educational information regarding their diagnosis, 2) how to care for their diagnosis at home, as well a 3) list of reasons why they would want to return to the ED prior to their follow-up appointment, should their condition change. DISCHARGE PLAN:  1. Current Discharge Medication List        CONTINUE these medications which have NOT CHANGED    Details   acetaminophen (TYLENOL) 325 mg tablet Take 2 Tablets by mouth every six (6) hours as needed for Pain. Qty: 20 Tablet, Refills: 0      ondansetron hcl (Zofran) 4 mg tablet Take 1 Tablet by mouth every eight (8) hours as needed for Nausea or Vomiting. Qty: 20 Tablet, Refills: 0      methocarbamoL (Robaxin-750) 750 mg tablet Take 1 Tablet by mouth three (3) times daily as needed for Muscle Spasm(s). Qty: 20 Tablet, Refills: 0      ibuprofen (MOTRIN) 600 mg tablet Take 1 Tablet by mouth three (3) times daily as needed for Pain.   Qty: 20 Tablet, Refills: 0      guaiFENesin (ROBITUSSIN) 100 mg/5 mL liquid Take 10 mL by mouth three (3) times daily as needed for Cough. Qty: 200 mL, Refills: 0      naproxen (NAPROSYN) 500 mg tablet Take 1 Tablet by mouth every twelve (12) hours as needed for Pain. Qty: 20 Tablet, Refills: 0           2. Follow-up Information       Follow up With Specialties Details Why Contact Info    [facility] Emergency Medicine  If symptoms worsen [address] [place] [id number] [phone]          3. Return to ED if worse   4. Discharge Medication List as of 11/10/2022  2:49 PM        START taking these medications    Details   promethazine-dextromethorphan (PROMETHAZINE-DM) 6.25-15 mg/5 mL syrup Take 5 mL by mouth every four (4) hours as needed for Cough for up to 7 days. , Normal, Disp-118 mL, R-0      !! acetaminophen (TYLENOL) 325 mg tablet Take 2 Tablets by mouth every four (4) hours as needed for Pain., Normal, Disp-20 Tablet, R-0      albuterol (Ventolin HFA) 90 mcg/actuation inhaler Take 2 Puffs by inhalation as needed for Wheezing., Normal, Disp-18 g, R-0      loratadine-pseudoephedrine (Claritin-D 12 Hour) 5-120 mg per tablet Take 1 Tablet by mouth two (2) times a day., Normal, Disp-30 Tablet, R-0       !! - Potential duplicate medications found. Please discuss with provider. CONTINUE these medications which have NOT CHANGED    Details   !! acetaminophen (TYLENOL) 325 mg tablet Take 2 Tablets by mouth every six (6) hours as needed for Pain., Normal, Disp-20 Tablet, R-0      ondansetron hcl (Zofran) 4 mg tablet Take 1 Tablet by mouth every eight (8) hours as needed for Nausea or Vomiting., Normal, Disp-20 Tablet, R-0      methocarbamoL (Robaxin-750) 750 mg tablet Take 1 Tablet by mouth three (3) times daily as needed for Muscle Spasm(s). , Normal, Disp-20 Tablet, R-0      ibuprofen (MOTRIN) 600 mg tablet Take 1 Tablet by mouth three (3) times daily as needed for Pain., Normal, Disp-20 Tablet, R-0      guaiFENesin (ROBITUSSIN) 100 mg/5 mL liquid Take 10 mL by mouth three (3) times daily as needed for Cough., Normal, Disp-200 mL, R-0      naproxen (NAPROSYN) 500 mg tablet Take 1 Tablet by mouth every twelve (12) hours as needed for Pain., Normal, Disp-20 Tablet, R-0       !! - Potential duplicate medications found. Please discuss with provider. Remove if admitted/transferred    Diagnosis/Clinical Impression     Clinical Impression:   1. Viral URI with cough        Attestations: Vivien Primrose, PA-C, am the primary clinician of record. Please note that this dictation was completed with [email or web address], the computer voice recognition software. Quite often unanticipated grammatical, syntax, homophones, and other interpretive errors are inadvertently transcribed by the computer software. Please disregard these errors. Please excuse any errors that have escaped final proofreading. Thank you.

## 2022-11-10 NOTE — Clinical Note
[address]  [address] [facility] [address]  [phone]    Work/School Note    Date: 11/10/2022    To Whom It May concern:    Sue Hernández was seen and treated today in the emergency room by the following provider(s):  Physician Assistant: Geneva Vega PA-C. Sue Hernández is excused from work/school on 11/10/2022 through 11/12/2022. He is medically clear to return to work/school on 11/13/2022.          Sincerely,          Caterina Renae PA-C